# Patient Record
Sex: MALE | Race: WHITE | NOT HISPANIC OR LATINO
[De-identification: names, ages, dates, MRNs, and addresses within clinical notes are randomized per-mention and may not be internally consistent; named-entity substitution may affect disease eponyms.]

---

## 2020-07-08 PROBLEM — Z00.00 ENCOUNTER FOR PREVENTIVE HEALTH EXAMINATION: Status: ACTIVE | Noted: 2020-07-08

## 2020-07-10 ENCOUNTER — APPOINTMENT (OUTPATIENT)
Dept: OTOLARYNGOLOGY | Facility: CLINIC | Age: 65
End: 2020-07-10
Payer: COMMERCIAL

## 2020-07-10 DIAGNOSIS — Z83.3 FAMILY HISTORY OF DIABETES MELLITUS: ICD-10-CM

## 2020-07-10 DIAGNOSIS — Z82.49 FAMILY HISTORY OF ISCHEMIC HEART DISEASE AND OTHER DISEASES OF THE CIRCULATORY SYSTEM: ICD-10-CM

## 2020-07-10 DIAGNOSIS — Z87.898 PERSONAL HISTORY OF OTHER SPECIFIED CONDITIONS: ICD-10-CM

## 2020-07-10 DIAGNOSIS — Z86.79 PERSONAL HISTORY OF OTHER DISEASES OF THE CIRCULATORY SYSTEM: ICD-10-CM

## 2020-07-10 DIAGNOSIS — Z87.39 PERSONAL HISTORY OF OTHER DISEASES OF THE MUSCULOSKELETAL SYSTEM AND CONNECTIVE TISSUE: ICD-10-CM

## 2020-07-10 DIAGNOSIS — G47.30 SLEEP APNEA, UNSPECIFIED: ICD-10-CM

## 2020-07-10 PROCEDURE — 99202 OFFICE O/P NEW SF 15 MIN: CPT | Mod: NC,95

## 2020-07-10 RX ORDER — ATORVASTATIN CALCIUM 80 MG/1
TABLET, FILM COATED ORAL
Refills: 0 | Status: ACTIVE | COMMUNITY

## 2020-07-10 RX ORDER — LISINOPRIL 30 MG/1
TABLET ORAL
Refills: 0 | Status: ACTIVE | COMMUNITY

## 2020-07-13 PROBLEM — G47.30 SLEEP APNEA: Status: ACTIVE | Noted: 2020-07-10

## 2020-07-13 NOTE — HISTORY OF PRESENT ILLNESS
[de-identified] : Initial visit. 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 He is not overweight.

## 2020-07-13 NOTE — ASSESSMENT
[FreeTextEntry1] : He will be scheduled for level 2 home sleep study.\par Pending the results he may benefit from a sleep medicine consultation.

## 2020-07-22 ENCOUNTER — APPOINTMENT (OUTPATIENT)
Dept: SLEEP CENTER | Facility: HOME HEALTH | Age: 65
End: 2020-07-22
Payer: COMMERCIAL

## 2020-07-22 ENCOUNTER — OUTPATIENT (OUTPATIENT)
Dept: OUTPATIENT SERVICES | Facility: HOSPITAL | Age: 65
LOS: 1 days | End: 2020-07-22
Payer: COMMERCIAL

## 2020-07-22 PROCEDURE — 95800 SLP STDY UNATTENDED: CPT

## 2020-07-22 PROCEDURE — 95800 SLP STDY UNATTENDED: CPT | Mod: 26

## 2020-07-23 DIAGNOSIS — G47.33 OBSTRUCTIVE SLEEP APNEA (ADULT) (PEDIATRIC): ICD-10-CM

## 2022-03-25 ENCOUNTER — APPOINTMENT (OUTPATIENT)
Dept: OTOLARYNGOLOGY | Facility: CLINIC | Age: 67
End: 2022-03-25
Payer: MEDICARE

## 2022-03-25 DIAGNOSIS — H90.3 SENSORINEURAL HEARING LOSS, BILATERAL: ICD-10-CM

## 2022-03-25 DIAGNOSIS — H93.19 TINNITUS, UNSPECIFIED EAR: ICD-10-CM

## 2022-03-25 DIAGNOSIS — R42 DIZZINESS AND GIDDINESS: ICD-10-CM

## 2022-03-25 PROCEDURE — 92567 TYMPANOMETRY: CPT

## 2022-03-25 PROCEDURE — 99213 OFFICE O/P EST LOW 20 MIN: CPT

## 2022-03-25 PROCEDURE — 92557 COMPREHENSIVE HEARING TEST: CPT

## 2022-03-25 NOTE — HISTORY OF PRESENT ILLNESS
[de-identified] : He reports that approximately 3 weeks ago he was in [facility].\par He was scuba diving and at about 15 feet he noticed some vertigo.\par Since that time he notices worsening of a known history of tinnitus.  The tinnitus is more pronounced in his left ear.\par He is carrying on activities of daily living.

## 2022-03-25 NOTE — ASSESSMENT
[FreeTextEntry1] : the audiogram was ordered by me and interpreted by me today and the results are as follows-he has some slight asymmetry in it type of left ear tympanogram in his left ear with a small conductive loss in the low to mid frequencies.  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  He has normal speech discrimination scores.\par \par It is likely that his symptoms are related to slightly stiff tympanogram.\par We discussed options which included observation versus a low-dose burst of steroid.  He would prefer to proceed with observation at this time which I believe is reasonable.

## 2025-02-21 RX ORDER — METHYLPREDNISOLONE 4 MG/1
4 TABLET ORAL
Qty: 1 | Refills: 0 | Status: ACTIVE | COMMUNITY
Start: 2025-02-21 | End: 1900-01-01